# Patient Record
Sex: FEMALE | Race: BLACK OR AFRICAN AMERICAN
[De-identification: names, ages, dates, MRNs, and addresses within clinical notes are randomized per-mention and may not be internally consistent; named-entity substitution may affect disease eponyms.]

---

## 2019-08-23 ENCOUNTER — HOSPITAL ENCOUNTER (EMERGENCY)
Dept: HOSPITAL 53 - M ED | Age: 24
Discharge: HOME | End: 2019-08-23
Payer: COMMERCIAL

## 2019-08-23 VITALS — DIASTOLIC BLOOD PRESSURE: 68 MMHG | SYSTOLIC BLOOD PRESSURE: 136 MMHG

## 2019-08-23 VITALS — WEIGHT: 200.42 LBS | HEIGHT: 63 IN | BODY MASS INDEX: 35.51 KG/M2
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
## 2019-08-23 DIAGNOSIS — S93.602A: ICD-10-CM

## 2019-08-23 DIAGNOSIS — S93.402A: Primary | ICD-10-CM

## 2019-08-24 NOTE — REP
Left foot four views :

There is no fracture or dislocation.

Mineralization and joint spaces are normal.

There are no calcifications or foreign bodies.

Impression:

Negative left foot .

 

 

Electronically Signed by

Aashish Graves MD 08/24/2019 08:00 A

## 2019-08-24 NOTE — REP
Left ankle four views :

There is no fracture or dislocation.

Mineralization and joint spaces are normal.

There are no calcifications or foreign bodies.

Impression:

Negative left ankle .

 

 

Electronically Signed by

Aashish Graves MD 08/24/2019 08:00 A